# Patient Record
Sex: FEMALE | Race: WHITE
[De-identification: names, ages, dates, MRNs, and addresses within clinical notes are randomized per-mention and may not be internally consistent; named-entity substitution may affect disease eponyms.]

---

## 2020-04-24 PROBLEM — K83.8 DILATION OF COMMON BILE DUCT: Status: ACTIVE | Noted: 2020-04-24

## 2020-11-02 ENCOUNTER — NURSE TRIAGE (OUTPATIENT)
Dept: OTHER | Facility: CLINIC | Age: 55
End: 2020-11-02

## 2020-11-02 NOTE — TELEPHONE ENCOUNTER
Reason for Disposition   [1] Skin growth or mole AND [2] two sides do not look the same (i.e., asymmetric)    Answer Assessment - Initial Assessment Questions  1. APPEARANCE of LESION: \"What does it look like? \"       Cheryal Maxim in color and bleeding    2. SIZE: \"How big is it? \" (e.g., compare to size of pinhead, tip of pen, eraser, coin, pea, grape, ping pong ball; or size in cms or inches)       Pin point and eraser size     3. COLOR: \"What color is it? \" \"Is there more than one color? \"      Red, raised one is bleeding due to scraped it with nail    4. SHAPE: \"What shape is it? \" (e.g., round, irregular)      Round irregular    5. RAISED: \"Does it stick up above the skin or is it flat? \" (e.g., raised or elevated)      Raised    6. TENDER: \"Does it hurt when you touch it? \"  (Scale 1-10; or mild, moderate, severe)      No pain    7. LOCATION: \"Where is it located? \"       Right side of breast this is the one that is bleeding, one to the right side of the face, and some to the right side of leg lower    8. ONSET: \"When did it first appear? \"       A few months ago    9. NUMBER: \"Is there just one? \" or \"Are there others? \"      More than 3    10. CAUSE: \"What do you think it is? \"        Not sure     11. OTHER SYMPTOMS: \"Do you have any other symptoms? \" (e.g., fever)        No    12. PREGNANCY: \"Is there any chance you are pregnant? \" \"When was your last menstrual period? \"        No    Protocols used: SKIN LESION - MOLES OR GROWTHS-ADULT-